# Patient Record
Sex: MALE | Race: WHITE | NOT HISPANIC OR LATINO | Employment: OTHER | URBAN - METROPOLITAN AREA
[De-identification: names, ages, dates, MRNs, and addresses within clinical notes are randomized per-mention and may not be internally consistent; named-entity substitution may affect disease eponyms.]

---

## 2023-09-18 PROBLEM — G62.9 NEUROPATHY: Status: ACTIVE | Noted: 2023-09-18

## 2023-09-18 PROBLEM — R60.0 LOWER EXTREMITY EDEMA: Status: ACTIVE | Noted: 2023-09-18

## 2023-09-18 PROBLEM — R13.13 DYSPHAGIA, CRICOPHARYNGEAL: Status: ACTIVE | Noted: 2023-09-18

## 2023-09-18 PROBLEM — R26.81 UNSTEADY GAIT: Status: ACTIVE | Noted: 2023-09-18

## 2023-09-18 PROBLEM — M48.07 LUMBOSACRAL STENOSIS WITH NEUROGENIC CLAUDICATION: Status: ACTIVE | Noted: 2023-09-18

## 2023-09-18 PROBLEM — R29.898 LOWER EXTREMITY WEAKNESS: Status: ACTIVE | Noted: 2023-09-18

## 2023-09-18 PROBLEM — I63.9 CEREBRAL INFARCTION (MULTI): Status: ACTIVE | Noted: 2023-09-18

## 2023-09-18 PROBLEM — G47.33 OSA (OBSTRUCTIVE SLEEP APNEA): Status: ACTIVE | Noted: 2023-09-18

## 2023-09-18 PROBLEM — J31.0 CHRONIC RHINITIS: Status: ACTIVE | Noted: 2023-09-18

## 2023-09-18 PROBLEM — M79.605 PAIN IN BOTH LOWER EXTREMITIES: Status: ACTIVE | Noted: 2023-09-18

## 2023-09-18 PROBLEM — E66.9 OBESITY (BMI 30-39.9): Status: ACTIVE | Noted: 2023-09-18

## 2023-09-18 PROBLEM — R13.10 DYSPHAGIA: Status: ACTIVE | Noted: 2023-09-18

## 2023-09-18 PROBLEM — E66.01 MORBID OBESITY (MULTI): Status: ACTIVE | Noted: 2023-09-18

## 2023-09-18 PROBLEM — J34.2 NASAL SEPTAL DEVIATION: Status: ACTIVE | Noted: 2023-09-18

## 2023-09-18 PROBLEM — I48.91 ATRIAL FIBRILLATION (MULTI): Status: ACTIVE | Noted: 2023-09-18

## 2023-09-18 PROBLEM — J34.3 HYPERTROPHY OF NASAL TURBINATES: Status: ACTIVE | Noted: 2023-09-18

## 2023-09-18 PROBLEM — R04.0 EPISTAXIS: Status: ACTIVE | Noted: 2023-09-18

## 2023-09-18 PROBLEM — E66.09 EXOGENOUS OBESITY: Status: ACTIVE | Noted: 2023-09-18

## 2023-09-18 PROBLEM — M54.9 BACK PAIN: Status: ACTIVE | Noted: 2023-09-18

## 2023-09-18 PROBLEM — M54.2 NECK PAIN: Status: ACTIVE | Noted: 2023-09-18

## 2023-09-18 PROBLEM — M79.604 PAIN IN BOTH LOWER EXTREMITIES: Status: ACTIVE | Noted: 2023-09-18

## 2023-09-18 PROBLEM — M54.12 CERVICAL RADICULOPATHY AT C7: Status: ACTIVE | Noted: 2023-09-18

## 2023-09-18 PROBLEM — I10 HYPERTENSION: Status: ACTIVE | Noted: 2023-09-18

## 2023-09-18 PROBLEM — K64.9 BLEEDING HEMORRHOID: Status: ACTIVE | Noted: 2023-09-18

## 2023-09-18 RX ORDER — ERGOCALCIFEROL 1.25 MG/1
1 CAPSULE ORAL
COMMUNITY
Start: 2018-06-22

## 2023-09-18 RX ORDER — MULTIVIT WITH MINERALS/HERBS
1 TABLET ORAL DAILY
COMMUNITY

## 2023-09-18 RX ORDER — TORSEMIDE 100 MG/1
1 TABLET ORAL DAILY
COMMUNITY

## 2023-09-18 RX ORDER — HYDROCORTISONE 25 MG/G
1 CREAM TOPICAL AS NEEDED
COMMUNITY

## 2023-09-18 RX ORDER — LANOLIN ALCOHOL/MO/W.PET/CERES
1 CREAM (GRAM) TOPICAL DAILY
COMMUNITY
Start: 2018-08-21

## 2023-09-18 RX ORDER — PREGABALIN 100 MG/1
1 CAPSULE ORAL 2 TIMES DAILY
COMMUNITY
Start: 2022-04-12 | End: 2023-10-19 | Stop reason: SDUPTHER

## 2023-09-18 RX ORDER — HYDRALAZINE HYDROCHLORIDE 50 MG/1
1 TABLET, FILM COATED ORAL 3 TIMES DAILY
COMMUNITY

## 2023-09-18 RX ORDER — DOFETILIDE 0.12 MG/1
1 CAPSULE ORAL 2 TIMES DAILY
COMMUNITY
Start: 2020-09-01

## 2023-09-18 RX ORDER — ISOSORBIDE MONONITRATE 30 MG/1
1 TABLET, EXTENDED RELEASE ORAL DAILY
COMMUNITY

## 2023-09-18 RX ORDER — ACETAMINOPHEN 500 MG
TABLET ORAL AS NEEDED
COMMUNITY

## 2023-10-19 ENCOUNTER — LAB (OUTPATIENT)
Dept: LAB | Facility: LAB | Age: 68
End: 2023-10-19
Payer: MEDICARE

## 2023-10-19 ENCOUNTER — OFFICE VISIT (OUTPATIENT)
Dept: NEUROLOGY | Facility: CLINIC | Age: 68
End: 2023-10-19
Payer: MEDICARE

## 2023-10-19 VITALS
HEIGHT: 71 IN | BODY MASS INDEX: 29.96 KG/M2 | SYSTOLIC BLOOD PRESSURE: 120 MMHG | WEIGHT: 214 LBS | TEMPERATURE: 97.1 F | HEART RATE: 87 BPM | RESPIRATION RATE: 16 BRPM | DIASTOLIC BLOOD PRESSURE: 82 MMHG

## 2023-10-19 DIAGNOSIS — M79.2 POLYNEUROPATHIC PAIN: ICD-10-CM

## 2023-10-19 DIAGNOSIS — G62.9 NEUROPATHY: ICD-10-CM

## 2023-10-19 DIAGNOSIS — M48.07 LUMBOSACRAL STENOSIS WITH NEUROGENIC CLAUDICATION: ICD-10-CM

## 2023-10-19 DIAGNOSIS — I48.11 LONGSTANDING PERSISTENT ATRIAL FIBRILLATION (MULTI): ICD-10-CM

## 2023-10-19 DIAGNOSIS — M79.604 PAIN IN BOTH LOWER EXTREMITIES: ICD-10-CM

## 2023-10-19 DIAGNOSIS — M54.50 CHRONIC LOW BACK PAIN WITHOUT SCIATICA, UNSPECIFIED BACK PAIN LATERALITY: ICD-10-CM

## 2023-10-19 DIAGNOSIS — R26.81 UNSTEADY GAIT: Primary | ICD-10-CM

## 2023-10-19 DIAGNOSIS — M79.605 PAIN IN BOTH LOWER EXTREMITIES: ICD-10-CM

## 2023-10-19 DIAGNOSIS — G89.29 CHRONIC LOW BACK PAIN WITHOUT SCIATICA, UNSPECIFIED BACK PAIN LATERALITY: ICD-10-CM

## 2023-10-19 DIAGNOSIS — G47.33 OSA (OBSTRUCTIVE SLEEP APNEA): ICD-10-CM

## 2023-10-19 LAB
AMPHETAMINES UR QL SCN: NORMAL
BARBITURATES UR QL SCN: NORMAL
BENZODIAZ UR QL SCN: NORMAL
BZE UR QL SCN: NORMAL
CANNABINOIDS UR QL SCN: NORMAL
FENTANYL+NORFENTANYL UR QL SCN: NORMAL
OPIATES UR QL SCN: NORMAL
OXYCODONE+OXYMORPHONE UR QL SCN: NORMAL
PCP UR QL SCN: NORMAL

## 2023-10-19 PROCEDURE — 3074F SYST BP LT 130 MM HG: CPT | Performed by: PSYCHIATRY & NEUROLOGY

## 2023-10-19 PROCEDURE — 1159F MED LIST DOCD IN RCRD: CPT | Performed by: PSYCHIATRY & NEUROLOGY

## 2023-10-19 PROCEDURE — 3079F DIAST BP 80-89 MM HG: CPT | Performed by: PSYCHIATRY & NEUROLOGY

## 2023-10-19 PROCEDURE — 1036F TOBACCO NON-USER: CPT | Performed by: PSYCHIATRY & NEUROLOGY

## 2023-10-19 PROCEDURE — 80307 DRUG TEST PRSMV CHEM ANLYZR: CPT

## 2023-10-19 PROCEDURE — 99215 OFFICE O/P EST HI 40 MIN: CPT | Performed by: PSYCHIATRY & NEUROLOGY

## 2023-10-19 PROCEDURE — 1160F RVW MEDS BY RX/DR IN RCRD: CPT | Performed by: PSYCHIATRY & NEUROLOGY

## 2023-10-19 RX ORDER — PREGABALIN 100 MG/1
100 CAPSULE ORAL 3 TIMES DAILY
Qty: 90 CAPSULE | Refills: 5 | Status: SHIPPED | OUTPATIENT
Start: 2023-10-19

## 2023-10-19 RX ORDER — POTASSIUM CHLORIDE 20 MEQ/1
20 TABLET, EXTENDED RELEASE ORAL DAILY
COMMUNITY

## 2023-10-19 NOTE — PROGRESS NOTES
Subjective     Omi Bernabe 68 y.o.  HPI  The patient states that he has not been using his BiPAP as he recently got a new machine and was having trouble using it.  The patient currently has lights out at around midnight and it takes him about 60 minutes to fall asleep.  The patient will wake for the day at 7 AM.  The patient's Cutler Sleepiness Scale score today was 2.  The patient states that he changed his sleeping position and that he is able to get to sleep easier.    The patient has been compliant with his Lyrica.  The patient states that he did try the neuropathy cream but it did not help.  The patient's neuropathy labs were normal with exception that his creatinine was mildly elevated at 1.38 and his uric acid was elevated at 10.5.  His KASEY was positive with a titer of 1:40.  I did review the patient's [facility] report.  The patient's last urine drug screen was on 10/25/2022.  The patient does feel that Lyrica is helping him.    Patient states that he still having neck and back pain and that these have been stable.    The patient is compliant with his Eliquis, Tikosyn, Apresoline and Demadex.    Review of Systems     Patient Active Problem List   Diagnosis    Atrial fibrillation (CMS/HCC)    Back pain    Bleeding hemorrhoid    Cerebral infarction (CMS/HCC)    Cervical radiculopathy at C7    Chronic rhinitis    Dysphagia    Dysphagia, cricopharyngeal    Epistaxis    Exogenous obesity    Hypertension    Hypertrophy of nasal turbinates    Lower extremity edema    Lower extremity weakness    Lumbosacral stenosis with neurogenic claudication    Obesity (BMI 30-39.9)    Nasal septal deviation    Neck pain    Neuropathy    DEVONTE (obstructive sleep apnea)    Pain in both lower extremities    Unsteady gait    Morbid obesity (CMS/HCC)        Past Medical History:   Diagnosis Date    Calculus of kidney     Recurrent kidney stones    Morbid (severe) obesity due to excess calories (CMS/HCC) 04/15/2021    Morbid obesity     Personal history of other benign neoplasm     History of other benign neoplasm    Personal history of other diseases of the circulatory system     History of hypertension    Personal history of other diseases of the circulatory system     History of atrial fibrillation    Personal history of other diseases of the circulatory system     History of cardiomyopathy    Personal history of other diseases of the nervous system and sense organs     History of sleep apnea        Past Surgical History:   Procedure Laterality Date    COLONOSCOPY      sept 2023    KIDNEY SURGERY  07/03/2018    Kidney Surgery    MR HEAD ANGIO WO IV CONTRAST  05/04/2021    MR HEAD ANGIO WO IV CONTRAST 5/4/2021 [facility]    MR NECK ANGIO WO IV CONTRAST  11/19/2020    MR NECK ANGIO WO IV CONTRAST 11/19/2020 [facility]    OTHER SURGICAL HISTORY  07/03/2018    Cardiac Catheter His Ablation    OTHER SURGICAL HISTORY  11/08/2019    Cardioversion        Social History     Socioeconomic History    Marital status: Single     Spouse name: Not on file    Number of children: Not on file    Years of education: Not on file    Highest education level: Not on file   Occupational History    Not on file   Tobacco Use    Smoking status: Never     Passive exposure: Never    Smokeless tobacco: Never   Substance and Sexual Activity    Alcohol use: Never    Drug use: Never    Sexual activity: Not on file   Other Topics Concern    Not on file   Social History Narrative    Not on file     Social Determinants of Health     Financial Resource Strain: Not on file   Food Insecurity: Not on file   Transportation Needs: Not on file   Physical Activity: Not on file   Stress: Not on file   Social Connections: Not on file   Intimate Partner Violence: Not on file   Housing Stability: Not on file        Family History   Problem Relation Name Age of Onset    Stroke Maternal Grandmother          Current Outpatient Medications   Medication Instructions     "acetaminophen (Tylenol) 500 mg tablet oral, As needed    apixaban (ELIQUIS) 5 mg, oral, 2 times daily    dofetilide (Tikosyn) 125 mcg capsule 1 capsule, oral, 2 times daily    ergocalciferol (Vitamin D-2) 1.25 MG (04400 UT) capsule 1 capsule, oral, Weekly    hydrALAZINE (Apresoline) 50 mg tablet 1 tablet, oral, 3 times daily    hydrocortisone 2.5 % cream 1 Application, Topical, As needed    isosorbide mononitrate ER (Imdur) 30 mg 24 hr tablet 1 tablet, oral, Daily    magnesium oxide (Mag-Ox) 400 mg (241.3 mg magnesium) tablet 1 tablet, oral, Daily    MULTIVITAMIN ORAL 1 capsule, oral, Daily    potassium chloride CR 20 mEq ER tablet 20 mEq, oral, Daily    pregabalin (Lyrica) 100 mg capsule 1 capsule, oral, 2 times daily    taurine 500 mg capsule 1 capsule, oral, Daily    torsemide (Demadex) 100 mg tablet 1 tablet, oral, Daily    vitamin B complex (Vitamins B Complex) tablet 1 tablet, oral, Daily        Allergies   Allergen Reactions    Bumetanide Unknown    Lisinopril Unknown    Metolazone Unknown    Oxycodone-Acetaminophen Unknown    Torsemide Unknown        Objective  /82 (BP Location: Right arm)   Pulse 87   Temp 36.2 °C (97.1 °F)   Resp 16   Ht 1.803 m (5' 11\")   Wt 97.1 kg (214 lb)   BMI 29.85 kg/m²    GENERAL APPEARANCE:  No distress, alert and cooperative.     MENTAL STATE:  Orientation was normal to time, place and person. Recent and remote memory was intact.  Attention span and concentration were normal. Language testing was normal for comprehension, repetition, expression, and naming. Calculation was intact. The patient could correctly interpret a picture, and copy a diagram. General fund of knowledge was intact. Mini-mental status examination was performed with no errors.     CRANIAL NERVES:  Cranial nerves were normal.      CN 2- Visual Acuity  OD: 20/20 (corrected)   OS: 20/20 (corrected); visual fields full to confrontation.      CN 3, 4, 6-  Pupils round, 4 mm in diameter, equally reactive " to light. No ptosis. EOMs normal alignment, full range of movement, no nystagmus     CN 5- Facial sensation intact bilaterally. Normal corneal reflexes.      CN 7- Normal and symmetric facial strength. Nasolabial folds symmetric.     CN 8- Hearing intact to finger rub, whisper.      CN 9- Palate elevates symmetrically. Normal gag reflex.      CN 11- Normal strength of shoulder shrug and neck turning      CN 12- Tongue midline, with normal bulk and strength; no fasciculations.     MOTOR:  Motor exam was normal. Muscle bulk and tone were normal in both upper and lower extremities. Muscle strength was 5/5 in distal and proximal muscles in both upper and lower extremities. No fasciculations, tremor or other abnormal movements were present.     GAIT: Station was stable with a normal base and negative Romberg sign. Gait was stable with a normal arm swing and speed. No ataxia, shuffling, steppage or waddling was noted. Tandem gait was intact. Postural reflexes were normal.     Assessment/Plan   The patient is doing very well from a neurological and sleep medicine standpoint.  The patient does need to restart his BiPAP and use it every night and all night.  After he has been on this for 1 month I would like to check a compliance report.  The patient needs to lose weight to his ideal body weight, get a least 8 hours sleep at night, avoid supine position and improve sleep hygiene.  The patient should continue his Eliquis and stroke risk factor modification.  I will increase his Lyrica to 100 mg 3 times a day.  The patient needs urine drug screen and needs to sign a CSA.  The patient can use symptomatic pain medicines for severe neck and back pain.  I discussed all these issues in detail with the patient and answered all of his questions.  The patient will follow-up with me in 6 months.

## 2023-10-19 NOTE — PATIENT INSTRUCTIONS
The patient does need to restart his BiPAP and use it every night and all night.  After he has been on this for 1 month I would like to check a compliance report.  The patient needs to lose weight to his ideal body weight, get a least 8 hours sleep at night, avoid supine position and improve sleep hygiene.  The patient should continue his Eliquis and stroke risk factor modification.  I will increase his Lyrica to 100 mg 3 times a day.  The patient needs urine drug screen and needs to sign a CSA.  The patient can use symptomatic pain medicines for severe neck and back pain.  I discussed all these issues in detail with the patient and answered all of his questions.  The patient will follow-up with me in 6 months.

## 2024-05-20 ENCOUNTER — APPOINTMENT (OUTPATIENT)
Dept: NEUROLOGY | Facility: CLINIC | Age: 69
End: 2024-05-20
Payer: MEDICARE